# Patient Record
Sex: FEMALE | Race: WHITE | NOT HISPANIC OR LATINO
[De-identification: names, ages, dates, MRNs, and addresses within clinical notes are randomized per-mention and may not be internally consistent; named-entity substitution may affect disease eponyms.]

---

## 2018-05-11 ENCOUNTER — RESULT REVIEW (OUTPATIENT)
Age: 33
End: 2018-05-11

## 2018-12-12 ENCOUNTER — RECORD ABSTRACTING (OUTPATIENT)
Age: 33
End: 2018-12-12

## 2018-12-12 DIAGNOSIS — Z82.49 FAMILY HISTORY OF ISCHEMIC HEART DISEASE AND OTHER DISEASES OF THE CIRCULATORY SYSTEM: ICD-10-CM

## 2018-12-12 DIAGNOSIS — E78.5 HYPERLIPIDEMIA, UNSPECIFIED: ICD-10-CM

## 2018-12-12 PROBLEM — Z00.00 ENCOUNTER FOR PREVENTIVE HEALTH EXAMINATION: Status: ACTIVE | Noted: 2018-12-12

## 2018-12-12 RX ORDER — LEVONORGESTREL AND ETHINYL ESTRADIOL 0.1-0.02MG
0.1-2 KIT ORAL DAILY
Refills: 0 | Status: ACTIVE | COMMUNITY

## 2018-12-19 ENCOUNTER — APPOINTMENT (OUTPATIENT)
Dept: FAMILY MEDICINE | Facility: CLINIC | Age: 33
End: 2018-12-19
Payer: OTHER GOVERNMENT

## 2018-12-19 ENCOUNTER — TRANSCRIPTION ENCOUNTER (OUTPATIENT)
Age: 33
End: 2018-12-19

## 2018-12-19 VITALS
HEART RATE: 99 BPM | BODY MASS INDEX: 39.15 KG/M2 | DIASTOLIC BLOOD PRESSURE: 80 MMHG | OXYGEN SATURATION: 98 % | RESPIRATION RATE: 18 BRPM | SYSTOLIC BLOOD PRESSURE: 124 MMHG | HEIGHT: 65 IN | WEIGHT: 235 LBS

## 2018-12-19 DIAGNOSIS — F40.243 FEAR OF FLYING: ICD-10-CM

## 2018-12-19 DIAGNOSIS — F90.0 ATTENTION-DEFICIT HYPERACTIVITY DISORDER, PREDOMINANTLY INATTENTIVE TYPE: ICD-10-CM

## 2018-12-19 PROCEDURE — 99213 OFFICE O/P EST LOW 20 MIN: CPT

## 2018-12-19 RX ORDER — DEXTROAMPHETAMINE SACCHARATE, AMPHETAMINE ASPARTATE MONOHYDRATE, DEXTROAMPHETAMINE SULFATE AND AMPHETAMINE SULFATE 5; 5; 5; 5 MG/1; MG/1; MG/1; MG/1
20 CAPSULE, EXTENDED RELEASE ORAL
Qty: 30 | Refills: 0 | Status: ACTIVE | COMMUNITY
Start: 1900-01-01 | End: 1900-01-01

## 2018-12-19 RX ORDER — ALPRAZOLAM 0.25 MG/1
0.25 TABLET ORAL
Qty: 30 | Refills: 0 | Status: ACTIVE | COMMUNITY
Start: 1900-01-01 | End: 1900-01-01

## 2019-01-01 PROBLEM — F40.243 FEAR OF FLYING: Status: RESOLVED | Noted: 2018-12-12 | Resolved: 2019-01-01

## 2019-01-01 PROBLEM — F90.0 ATTENTION DEFICIT HYPERACTIVITY DISORDER (ADHD), PREDOMINANTLY INATTENTIVE TYPE: Status: ACTIVE | Noted: 2018-12-12

## 2019-01-01 NOTE — REVIEW OF SYSTEMS
[Negative] : Heme/Lymph [Night Sweats] : no night sweats [Recent Change In Weight] : ~T no recent weight change [Palpitations] : no palpitations [Headache] : no headache [Dizziness] : no dizziness [Confusion] : no confusion [Memory Loss] : no memory loss [Unsteady Walking] : no ataxia

## 2019-01-01 NOTE — HISTORY OF PRESENT ILLNESS
[FreeTextEntry1] : 6 week followup of ADHD and new medication. [de-identified] : Patient presents for followup of ADHD and the medication regimen. Started on Adderall XR 20 mg/day, with good therapeutic response. Tolerating medication well. No new symptoms. Also, needs refill of alprazolam for treatment of air travel phobia.

## 2019-03-01 ENCOUNTER — APPOINTMENT (OUTPATIENT)
Dept: FAMILY MEDICINE | Facility: CLINIC | Age: 34
End: 2019-03-01

## 2019-03-11 ENCOUNTER — TRANSCRIPTION ENCOUNTER (OUTPATIENT)
Age: 34
End: 2019-03-11

## 2019-03-27 ENCOUNTER — APPOINTMENT (OUTPATIENT)
Dept: FAMILY MEDICINE | Facility: CLINIC | Age: 34
End: 2019-03-27

## 2020-08-17 ENCOUNTER — HOSPITAL ENCOUNTER (EMERGENCY)
Dept: HOSPITAL 74 - JVIRT | Age: 35
Discharge: HOME | End: 2020-08-17
Payer: COMMERCIAL

## 2020-08-17 DIAGNOSIS — Z11.59: Primary | ICD-10-CM

## 2020-08-17 PROCEDURE — U0003 INFECTIOUS AGENT DETECTION BY NUCLEIC ACID (DNA OR RNA); SEVERE ACUTE RESPIRATORY SYNDROME CORONAVIRUS 2 (SARS-COV-2) (CORONAVIRUS DISEASE [COVID-19]), AMPLIFIED PROBE TECHNIQUE, MAKING USE OF HIGH THROUGHPUT TECHNOLOGIES AS DESCRIBED BY CMS-2020-01-R: HCPCS

## 2020-08-17 NOTE — TELE
HPI


Do you have fever,cough or shortness of breath?: No





- General


Reason For Visit: COVID 19 TESTING


History Source: Patient





Past History





- Travel History


Traveled outside of the country in the last 30 days: No


Close contact w/someone who was outside of country & ill: No





- Medical History


Anemia: No


Asthma: Yes (seasonal asthma)


Cancer: No


Cardiac Disorders: No


Hx Myocardial Infarction: No


CVA: No


COPD: No


CHF: No


DVT: No


Dementia: No


Diabetes: No


Hx Glaucoma: No


Dialysis: No


GI Disorders: No


 Disorders: No


HTN: No


Hypercholesterolemia: No


HIV: No


Kidney Stones: No


Liver Disease: No


Psychiatric Problems: No


Seizures: No


Thyroid Disease: No


Lung CA: No





- Surgical History


Abdominal Surgery: No


Appendectomy: No


Cardiac Surgery: No


Cholecystectomy: No


Gastric Stapling: No


GI Surgery: No


Lung Surgery: No


Neurologic Surgery: No


Orthopedic Surgery: No





**Review of Systems





- Review of Systems


Constitutional: No: Chills, Fever, Night Sweats


Respiratory: No: Cough





*Physical Exam





- Physical Exam


Respiratory/Chest: negative: Respiratory Distress





Discharge


Diagnosis at time of Disposition: 


 COVID-19 virus test result unknown





Diagnosis at time of Disposition: 


 (Ruled Out): Encounter for preprocedure screening laboratory testing for severe

acute respiratory syndrome coronavirus 2 (SARS-CoV-2)





- Referrals


Follow-up Referral(s): 


Kathleen Hutchinson MD [Staff Physician] - 





- Patient Instructions





- Discharge


Disposition: HOME


Condition at time of Disposition: Stable

## 2020-10-07 ENCOUNTER — HOSPITAL ENCOUNTER (EMERGENCY)
Dept: HOSPITAL 74 - JVIRT | Age: 35
Discharge: HOME | End: 2020-10-07
Payer: COMMERCIAL

## 2020-10-07 DIAGNOSIS — Z11.59: Primary | ICD-10-CM

## 2020-10-07 PROCEDURE — U0003 INFECTIOUS AGENT DETECTION BY NUCLEIC ACID (DNA OR RNA); SEVERE ACUTE RESPIRATORY SYNDROME CORONAVIRUS 2 (SARS-COV-2) (CORONAVIRUS DISEASE [COVID-19]), AMPLIFIED PROBE TECHNIQUE, MAKING USE OF HIGH THROUGHPUT TECHNOLOGIES AS DESCRIBED BY CMS-2020-01-R: HCPCS

## 2020-10-07 PROCEDURE — C9803 HOPD COVID-19 SPEC COLLECT: HCPCS

## 2022-08-13 ENCOUNTER — NON-APPOINTMENT (OUTPATIENT)
Age: 37
End: 2022-08-13

## 2022-08-29 ENCOUNTER — HOSPITAL ENCOUNTER (EMERGENCY)
Dept: HOSPITAL 74 - FER | Age: 37
Discharge: HOME | End: 2022-08-29
Payer: COMMERCIAL

## 2022-08-29 VITALS
HEART RATE: 83 BPM | SYSTOLIC BLOOD PRESSURE: 123 MMHG | DIASTOLIC BLOOD PRESSURE: 72 MMHG | RESPIRATION RATE: 18 BRPM | TEMPERATURE: 98.3 F

## 2022-08-29 VITALS — BODY MASS INDEX: 36.6 KG/M2

## 2022-08-29 DIAGNOSIS — S92.355A: Primary | ICD-10-CM
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
## 2022-08-29 LAB
ALBUMIN SERPL-MCNC: 3.9 G/DL (ref 3.4–5)
ALP SERPL-CCNC: 59 U/L (ref 45–117)
ALT SERPL-CCNC: 18 U/L (ref 13–61)
ANION GAP SERPL CALC-SCNC: 12 MMOL/L (ref 8–16)
AST SERPL-CCNC: 16 U/L (ref 15–37)
BILIRUB SERPL-MCNC: 0.8 MG/DL (ref 0.2–1)
BUN SERPL-MCNC: 13 MG/DL (ref 7–18)
CALCIUM SERPL-MCNC: 9 MG/DL (ref 8.5–10)
CHLORIDE SERPL-SCNC: 103 MMOL/L (ref 98–107)
CO2 SERPL-SCNC: 23 MMOL/L (ref 21–32)
CREAT SERPL-MCNC: 0.6 MG/DL (ref 0.55–1.3)
DEPRECATED RDW RBC AUTO: 15 % (ref 11.6–15.6)
GLUCOSE SERPL-MCNC: 92 MG/DL (ref 74–106)
HCT VFR BLD CALC: 37.8 % (ref 32.4–45.2)
HGB BLD-MCNC: 13 G/DL (ref 10.7–15.3)
MCH RBC QN AUTO: 30 PG (ref 25.7–33.7)
MCHC RBC AUTO-ENTMCNC: 34.3 G/DL (ref 32–36)
MCV RBC: 87.5 FL (ref 80–96)
PLATELET # BLD AUTO: 276.2 10^3/UL (ref 134–434)
PMV BLD: 8.1 FL (ref 7.5–11.1)
PROT SERPL-MCNC: 6.4 G/DL (ref 6.4–8.2)
RBC # BLD AUTO: 4.32 10^6/UL (ref 3.6–5.2)
SODIUM SERPL-SCNC: 138 MMOL/L (ref 136–145)
WBC # BLD AUTO: 7.5 10^3/UL (ref 4–10.8)

## 2022-11-23 ENCOUNTER — APPOINTMENT (OUTPATIENT)
Dept: INTERNAL MEDICINE | Facility: CLINIC | Age: 37
End: 2022-11-23

## 2022-11-23 ENCOUNTER — NON-APPOINTMENT (OUTPATIENT)
Age: 37
End: 2022-11-23

## 2022-11-23 VITALS
TEMPERATURE: 99.8 F | WEIGHT: 234 LBS | RESPIRATION RATE: 18 BRPM | DIASTOLIC BLOOD PRESSURE: 74 MMHG | HEIGHT: 65 IN | HEART RATE: 95 BPM | OXYGEN SATURATION: 97 % | BODY MASS INDEX: 38.99 KG/M2 | SYSTOLIC BLOOD PRESSURE: 114 MMHG

## 2022-11-23 DIAGNOSIS — Z23 ENCOUNTER FOR IMMUNIZATION: ICD-10-CM

## 2022-11-23 DIAGNOSIS — Z78.9 OTHER SPECIFIED HEALTH STATUS: ICD-10-CM

## 2022-11-23 DIAGNOSIS — J45.909 UNSPECIFIED ASTHMA, UNCOMPLICATED: ICD-10-CM

## 2022-11-23 DIAGNOSIS — E55.9 VITAMIN D DEFICIENCY, UNSPECIFIED: ICD-10-CM

## 2022-11-23 DIAGNOSIS — J06.9 ACUTE UPPER RESPIRATORY INFECTION, UNSPECIFIED: ICD-10-CM

## 2022-11-23 DIAGNOSIS — Z00.00 ENCOUNTER FOR GENERAL ADULT MEDICAL EXAMINATION W/OUT ABNORMAL FINDINGS: ICD-10-CM

## 2022-11-23 PROCEDURE — 90471 IMMUNIZATION ADMIN: CPT

## 2022-11-23 PROCEDURE — 36415 COLL VENOUS BLD VENIPUNCTURE: CPT

## 2022-11-23 PROCEDURE — 99395 PREV VISIT EST AGE 18-39: CPT | Mod: 25

## 2022-11-23 PROCEDURE — 99213 OFFICE O/P EST LOW 20 MIN: CPT | Mod: 25

## 2022-11-23 PROCEDURE — 90686 IIV4 VACC NO PRSV 0.5 ML IM: CPT

## 2022-11-23 RX ORDER — UBIDECARENONE 30 MG
CAPSULE ORAL
Refills: 0 | Status: DISCONTINUED | COMMUNITY
End: 2022-11-23

## 2022-11-23 RX ORDER — ALBUTEROL SULFATE 90 UG/1
108 (90 BASE) AEROSOL, METERED RESPIRATORY (INHALATION)
Qty: 1 | Refills: 0 | Status: ACTIVE | COMMUNITY

## 2022-11-23 RX ORDER — CHROMIUM 200 MCG
1000 TABLET ORAL DAILY
Refills: 0 | Status: DISCONTINUED | COMMUNITY
End: 2022-11-23

## 2022-11-23 NOTE — HISTORY OF PRESENT ILLNESS
[FreeTextEntry1] : Pt is here for annual PE and to go over chronic medical conditions.\par 2 days dry cough, rhinitis. No SOB, fever, myalgias. \par Had 2 negative home COVID tests since  yesterday.

## 2022-11-23 NOTE — PHYSICAL EXAM
[No Acute Distress] : no acute distress [Normal Voice/Communication] : normal voice/communication [Normal Sclera/Conjunctiva] : normal sclera/conjunctiva [Normal Oropharynx] : the oropharynx was normal [No Lymphadenopathy] : no lymphadenopathy [Normal] : normal rate, regular rhythm, normal S1 and S2 and no murmur heard [No Edema] : there was no peripheral edema [Normal Appearance] : normal in appearance [No Masses] : no palpable masses [No Nipple Discharge] : no nipple discharge [No Axillary Lymphadenopathy] : no axillary lymphadenopathy [de-identified] : Bilateral enlarged and mucoid nasal mucosa

## 2022-11-23 NOTE — REVIEW OF SYSTEMS
[Nasal Discharge] : nasal discharge [Cough] : cough [Fever] : no fever [Vision Problems] : no vision problems [Sore Throat] : no sore throat [Chest Pain] : no chest pain [Palpitations] : no palpitations [Shortness Of Breath] : no shortness of breath [Wheezing] : no wheezing [Abdominal Pain] : no abdominal pain [Nausea] : no nausea [Diarrhea] : diarrhea [Vomiting] : no vomiting [Heartburn] : no heartburn [Dysuria] : no dysuria [Hematuria] : no hematuria [FreeTextEntry7] : Blood in stool

## 2022-11-25 ENCOUNTER — APPOINTMENT (OUTPATIENT)
Dept: INTERNAL MEDICINE | Facility: CLINIC | Age: 37
End: 2022-11-25

## 2022-11-25 LAB
25(OH)D3 SERPL-MCNC: 24.7 NG/ML
ALBUMIN SERPL ELPH-MCNC: 4.4 G/DL
ALP BLD-CCNC: 82 U/L
ALT SERPL-CCNC: 14 U/L
ANION GAP SERPL CALC-SCNC: 14 MMOL/L
AST SERPL-CCNC: 14 U/L
BASOPHILS # BLD AUTO: 0.04 K/UL
BASOPHILS NFR BLD AUTO: 0.7 %
BILIRUB SERPL-MCNC: 0.2 MG/DL
BUN SERPL-MCNC: 9 MG/DL
CALCIUM SERPL-MCNC: 9.3 MG/DL
CHLORIDE SERPL-SCNC: 105 MMOL/L
CHOLEST SERPL-MCNC: 223 MG/DL
CO2 SERPL-SCNC: 20 MMOL/L
CREAT SERPL-MCNC: 0.71 MG/DL
CREAT SPEC-SCNC: 280 MG/DL
EGFR: 111 ML/MIN/1.73M2
EOSINOPHIL # BLD AUTO: 0.25 K/UL
EOSINOPHIL NFR BLD AUTO: 4.7 %
ESTIMATED AVERAGE GLUCOSE: 108 MG/DL
GLUCOSE SERPL-MCNC: 94 MG/DL
HBA1C MFR BLD HPLC: 5.4 %
HBV SURFACE AB SER QL: REACTIVE
HBV SURFACE AG SER QL: NONREACTIVE
HCT VFR BLD CALC: 43.5 %
HDLC SERPL-MCNC: 78 MG/DL
HGB BLD-MCNC: 13.7 G/DL
IMM GRANULOCYTES NFR BLD AUTO: 0 %
INFLUENZA A RESULT: NOT DETECTED
INFLUENZA B RESULT: NOT DETECTED
LDLC SERPL CALC-MCNC: 130 MG/DL
LYMPHOCYTES # BLD AUTO: 1.31 K/UL
LYMPHOCYTES NFR BLD AUTO: 24.5 %
MAN DIFF?: NORMAL
MCHC RBC-ENTMCNC: 29.1 PG
MCHC RBC-ENTMCNC: 31.5 GM/DL
MCV RBC AUTO: 92.4 FL
MEV IGG FLD QL IA: 42.5 AU/ML
MEV IGG+IGM SER-IMP: POSITIVE
MICROALBUMIN 24H UR DL<=1MG/L-MCNC: 1.6 MG/DL
MICROALBUMIN/CREAT 24H UR-RTO: 6 MG/G
MONOCYTES # BLD AUTO: 0.7 K/UL
MONOCYTES NFR BLD AUTO: 13.1 %
MUV AB SER-ACNC: POSITIVE
MUV IGG SER QL IA: 73.9 AU/ML
NEUTROPHILS # BLD AUTO: 3.04 K/UL
NEUTROPHILS NFR BLD AUTO: 57 %
NONHDLC SERPL-MCNC: 144 MG/DL
PLATELET # BLD AUTO: 299 K/UL
POTASSIUM SERPL-SCNC: 4.3 MMOL/L
PROT SERPL-MCNC: 6.9 G/DL
RBC # BLD: 4.71 M/UL
RBC # FLD: 13.2 %
RESP SYN VIRUS RESULT: DETECTED
RUBV IGG FLD-ACNC: 1.7 INDEX
RUBV IGG SER-IMP: POSITIVE
SARS-COV-2 RESULT: NOT DETECTED
SODIUM SERPL-SCNC: 139 MMOL/L
T3FREE SERPL-MCNC: 2.75 PG/ML
T4 FREE SERPL-MCNC: 1.1 NG/DL
TRIGL SERPL-MCNC: 70 MG/DL
TSH SERPL-ACNC: 0.51 UIU/ML
VZV AB TITR SER: POSITIVE
VZV IGG SER IF-ACNC: 1801 INDEX
WBC # FLD AUTO: 5.34 K/UL

## 2022-11-25 PROCEDURE — 36415 COLL VENOUS BLD VENIPUNCTURE: CPT

## 2022-11-25 RX ORDER — ADHESIVE TAPE 3"X 2.3 YD
50 MCG TAPE, NON-MEDICATED TOPICAL
Refills: 0 | Status: ACTIVE | COMMUNITY
Start: 2022-11-25

## 2023-11-14 ENCOUNTER — NON-APPOINTMENT (OUTPATIENT)
Age: 38
End: 2023-11-14

## 2024-03-27 ENCOUNTER — NON-APPOINTMENT (OUTPATIENT)
Age: 39
End: 2024-03-27

## 2024-05-27 ENCOUNTER — NON-APPOINTMENT (OUTPATIENT)
Age: 39
End: 2024-05-27

## 2024-08-21 NOTE — TELE
August 21, 2024     Patient: Adwoa Lancaster   YOB: 2011   Date of Visit: 8/21/2024       To Whom it May Concern:    Adwoa Lancaster was seen in my clinic on 8/21/2024 at 12:00 pm.     Please excuse Adwoa for her absence from school on the date listed above to be able to make her appointment.    Sincerely,         Teodora Bray MD    Medical information is confidential and cannot be disclosed without the written consent of the patient or her representative.       HPI





- General


Reason For Visit: VIRTUAL VISIT


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





Past History





- Medical History


Anemia: No


Asthma: Yes (seasonal asthma)


Cancer: No


Cardiac Disorders: No


CVA: No


COPD: No


CHF: No


DVT: No


Dementia: No


Diabetes: No


Dialysis: No


GI Disorders: No


 Disorders: No


HTN: No


Hypercholesterolemia: No


Kidney Stones: No


Liver Disease: No


Psychiatric Problems: No


Seizures: No


Thyroid Disease: No


Lung CA: No





- Surgical History


Abdominal Surgery: No


Appendectomy: No


Cardiac Surgery: No


Cholecystectomy: No


Gastric Stapling: No


GI Surgery: No


Lung Surgery: No


Neurologic Surgery: No


Orthopedic Surgery: No





**Review of Systems





- Review of Systems


Constitutional: No: Fever


Respiratory: No: Cough





Discharge


Diagnosis at time of Disposition: 


 Encounter for screening laboratory testing for COVID-19 virus








- Referrals





- Patient Instructions





- Discharge


Disposition: HOME


Condition at time of Disposition: Stable